# Patient Record
Sex: FEMALE | Race: WHITE | URBAN - METROPOLITAN AREA
[De-identification: names, ages, dates, MRNs, and addresses within clinical notes are randomized per-mention and may not be internally consistent; named-entity substitution may affect disease eponyms.]

---

## 2017-03-28 NOTE — PHARMACY-ADMISSION MEDICATION HISTORY
Med rec completed by pre admitting Hanna Stanford RN Fri Mar 24, 2017  4:34 PM         Prior to Admission medications    Medication Sig Last Dose Taking? Auth Provider   Prenatal Multivit-Min-Fe-FA (PRENATAL VITAMINS PO) Take 1 tablet by mouth daily  Yes Reported, Patient

## 2017-03-30 ENCOUNTER — HOSPITAL ENCOUNTER (OUTPATIENT)
Dept: LAB | Facility: CLINIC | Age: 32
Discharge: HOME | End: 2017-03-30
Attending: OBSTETRICS & GYNECOLOGY | Admitting: OBSTETRICS & GYNECOLOGY
Payer: COMMERCIAL

## 2017-03-30 DIAGNOSIS — Z01.812 PRE-OPERATIVE LABORATORY EXAMINATION: ICD-10-CM

## 2017-03-30 LAB
ABO + RH BLD: NORMAL
ABO + RH BLD: NORMAL
BLD GP AB SCN SERPL QL: NORMAL
BLOOD BANK CMNT PATIENT-IMP: NORMAL
HGB BLD-MCNC: 12.4 G/DL (ref 11.7–15.7)
SPECIMEN EXP DATE BLD: NORMAL
T PALLIDUM IGG+IGM SER QL: NEGATIVE

## 2017-03-30 PROCEDURE — 36415 COLL VENOUS BLD VENIPUNCTURE: CPT | Performed by: OBSTETRICS & GYNECOLOGY

## 2017-03-30 PROCEDURE — 85018 HEMOGLOBIN: CPT | Performed by: OBSTETRICS & GYNECOLOGY

## 2017-03-30 PROCEDURE — 86850 RBC ANTIBODY SCREEN: CPT | Performed by: OBSTETRICS & GYNECOLOGY

## 2017-03-30 PROCEDURE — 86780 TREPONEMA PALLIDUM: CPT | Performed by: OBSTETRICS & GYNECOLOGY

## 2017-03-30 PROCEDURE — 86901 BLOOD TYPING SEROLOGIC RH(D): CPT | Performed by: OBSTETRICS & GYNECOLOGY

## 2017-03-30 PROCEDURE — 86900 BLOOD TYPING SEROLOGIC ABO: CPT | Performed by: OBSTETRICS & GYNECOLOGY

## 2017-03-31 ENCOUNTER — HOSPITAL ENCOUNTER (INPATIENT)
Facility: CLINIC | Age: 32
LOS: 2 days | Discharge: HOME | End: 2017-04-02
Attending: OBSTETRICS & GYNECOLOGY | Admitting: OBSTETRICS & GYNECOLOGY
Payer: COMMERCIAL

## 2017-03-31 ENCOUNTER — ANESTHESIA (OUTPATIENT)
Dept: OBGYN | Facility: CLINIC | Age: 32
End: 2017-03-31
Payer: COMMERCIAL

## 2017-03-31 ENCOUNTER — ANESTHESIA EVENT (OUTPATIENT)
Dept: OBGYN | Facility: CLINIC | Age: 32
End: 2017-03-31
Payer: COMMERCIAL

## 2017-03-31 ENCOUNTER — SURGERY (OUTPATIENT)
Age: 32
End: 2017-03-31

## 2017-03-31 DIAGNOSIS — Z98.891 S/P CESAREAN SECTION: Primary | ICD-10-CM

## 2017-03-31 PROCEDURE — 25800025 ZZH RX 258: Performed by: OBSTETRICS & GYNECOLOGY

## 2017-03-31 PROCEDURE — 25000128 H RX IP 250 OP 636: Performed by: OBSTETRICS & GYNECOLOGY

## 2017-03-31 PROCEDURE — 25000128 H RX IP 250 OP 636: Performed by: NURSE ANESTHETIST, CERTIFIED REGISTERED

## 2017-03-31 PROCEDURE — 71000012 ZZH RECOVERY PHASE 1 LEVEL 1 FIRST HR: Performed by: OBSTETRICS & GYNECOLOGY

## 2017-03-31 PROCEDURE — 12000029 ZZH R&B OB INTERMEDIATE

## 2017-03-31 PROCEDURE — 27210995 ZZH RX 272: Performed by: OBSTETRICS & GYNECOLOGY

## 2017-03-31 PROCEDURE — 36000056 ZZH SURGERY LEVEL 3 1ST 30 MIN: Performed by: OBSTETRICS & GYNECOLOGY

## 2017-03-31 PROCEDURE — 25800025 ZZH RX 258: Performed by: ANESTHESIOLOGY

## 2017-03-31 PROCEDURE — 25000125 ZZHC RX 250: Performed by: NURSE ANESTHETIST, CERTIFIED REGISTERED

## 2017-03-31 PROCEDURE — 25000132 ZZH RX MED GY IP 250 OP 250 PS 637: Performed by: OBSTETRICS & GYNECOLOGY

## 2017-03-31 PROCEDURE — 37000009 ZZH ANESTHESIA TECHNICAL FEE, EACH ADDTL 15 MIN: Performed by: OBSTETRICS & GYNECOLOGY

## 2017-03-31 PROCEDURE — 37000008 ZZH ANESTHESIA TECHNICAL FEE, 1ST 30 MIN: Performed by: OBSTETRICS & GYNECOLOGY

## 2017-03-31 PROCEDURE — 25000125 ZZHC RX 250: Performed by: OBSTETRICS & GYNECOLOGY

## 2017-03-31 PROCEDURE — 27210794 ZZH OR GENERAL SUPPLY STERILE: Performed by: OBSTETRICS & GYNECOLOGY

## 2017-03-31 PROCEDURE — 36000058 ZZH SURGERY LEVEL 3 EA 15 ADDTL MIN: Performed by: OBSTETRICS & GYNECOLOGY

## 2017-03-31 RX ORDER — LABETALOL HYDROCHLORIDE 5 MG/ML
10 INJECTION, SOLUTION INTRAVENOUS
Status: DISCONTINUED | OUTPATIENT
Start: 2017-03-31 | End: 2017-03-31 | Stop reason: HOSPADM

## 2017-03-31 RX ORDER — ONDANSETRON 2 MG/ML
INJECTION INTRAMUSCULAR; INTRAVENOUS PRN
Status: DISCONTINUED | OUTPATIENT
Start: 2017-03-31 | End: 2017-03-31

## 2017-03-31 RX ORDER — DIPHENHYDRAMINE HYDROCHLORIDE 50 MG/ML
25 INJECTION INTRAMUSCULAR; INTRAVENOUS EVERY 6 HOURS PRN
Status: DISCONTINUED | OUTPATIENT
Start: 2017-03-31 | End: 2017-04-02 | Stop reason: HOSPADM

## 2017-03-31 RX ORDER — LANOLIN 100 %
OINTMENT (GRAM) TOPICAL
Status: DISCONTINUED | OUTPATIENT
Start: 2017-03-31 | End: 2017-04-02 | Stop reason: HOSPADM

## 2017-03-31 RX ORDER — ONDANSETRON 2 MG/ML
4 INJECTION INTRAMUSCULAR; INTRAVENOUS EVERY 6 HOURS PRN
Status: DISCONTINUED | OUTPATIENT
Start: 2017-03-31 | End: 2017-03-31 | Stop reason: HOSPADM

## 2017-03-31 RX ORDER — OXYTOCIN 10 [USP'U]/ML
10 INJECTION, SOLUTION INTRAMUSCULAR; INTRAVENOUS
Status: DISCONTINUED | OUTPATIENT
Start: 2017-03-31 | End: 2017-04-02 | Stop reason: HOSPADM

## 2017-03-31 RX ORDER — SODIUM CHLORIDE, SODIUM LACTATE, POTASSIUM CHLORIDE, CALCIUM CHLORIDE 600; 310; 30; 20 MG/100ML; MG/100ML; MG/100ML; MG/100ML
INJECTION, SOLUTION INTRAVENOUS CONTINUOUS
Status: DISCONTINUED | OUTPATIENT
Start: 2017-03-31 | End: 2017-03-31

## 2017-03-31 RX ORDER — AMOXICILLIN 250 MG
1-2 CAPSULE ORAL 2 TIMES DAILY
Status: DISCONTINUED | OUTPATIENT
Start: 2017-03-31 | End: 2017-04-02 | Stop reason: HOSPADM

## 2017-03-31 RX ORDER — OXYTOCIN/0.9 % SODIUM CHLORIDE 30/500 ML
PLASTIC BAG, INJECTION (ML) INTRAVENOUS PRN
Status: DISCONTINUED | OUTPATIENT
Start: 2017-03-31 | End: 2017-03-31

## 2017-03-31 RX ORDER — NALBUPHINE HYDROCHLORIDE 10 MG/ML
2.5-5 INJECTION, SOLUTION INTRAMUSCULAR; INTRAVENOUS; SUBCUTANEOUS EVERY 6 HOURS PRN
Status: DISCONTINUED | OUTPATIENT
Start: 2017-03-31 | End: 2017-03-31 | Stop reason: HOSPADM

## 2017-03-31 RX ORDER — CEFAZOLIN SODIUM 1 G/3ML
1 INJECTION, POWDER, FOR SOLUTION INTRAMUSCULAR; INTRAVENOUS
Status: DISCONTINUED | OUTPATIENT
Start: 2017-03-31 | End: 2017-03-31

## 2017-03-31 RX ORDER — METOCLOPRAMIDE HYDROCHLORIDE 5 MG/ML
10 INJECTION INTRAMUSCULAR; INTRAVENOUS EVERY 6 HOURS PRN
Status: DISCONTINUED | OUTPATIENT
Start: 2017-03-31 | End: 2017-04-02 | Stop reason: HOSPADM

## 2017-03-31 RX ORDER — HYDROMORPHONE HYDROCHLORIDE 1 MG/ML
.3-.5 INJECTION, SOLUTION INTRAMUSCULAR; INTRAVENOUS; SUBCUTANEOUS EVERY 30 MIN PRN
Status: DISCONTINUED | OUTPATIENT
Start: 2017-03-31 | End: 2017-04-02 | Stop reason: HOSPADM

## 2017-03-31 RX ORDER — HYDRALAZINE HYDROCHLORIDE 20 MG/ML
2.5-5 INJECTION INTRAMUSCULAR; INTRAVENOUS EVERY 10 MIN PRN
Status: DISCONTINUED | OUTPATIENT
Start: 2017-03-31 | End: 2017-03-31 | Stop reason: HOSPADM

## 2017-03-31 RX ORDER — LIDOCAINE 40 MG/G
CREAM TOPICAL
Status: DISCONTINUED | OUTPATIENT
Start: 2017-03-31 | End: 2017-04-02 | Stop reason: HOSPADM

## 2017-03-31 RX ORDER — NALOXONE HYDROCHLORIDE 0.4 MG/ML
.1-.4 INJECTION, SOLUTION INTRAMUSCULAR; INTRAVENOUS; SUBCUTANEOUS
Status: DISCONTINUED | OUTPATIENT
Start: 2017-03-31 | End: 2017-03-31 | Stop reason: HOSPADM

## 2017-03-31 RX ORDER — ONDANSETRON 2 MG/ML
4 INJECTION INTRAMUSCULAR; INTRAVENOUS EVERY 6 HOURS PRN
Status: DISCONTINUED | OUTPATIENT
Start: 2017-03-31 | End: 2017-04-02 | Stop reason: HOSPADM

## 2017-03-31 RX ORDER — NALBUPHINE HYDROCHLORIDE 10 MG/ML
INJECTION, SOLUTION INTRAMUSCULAR; INTRAVENOUS; SUBCUTANEOUS
Status: DISCONTINUED
Start: 2017-03-31 | End: 2017-03-31 | Stop reason: WASHOUT

## 2017-03-31 RX ORDER — LIDOCAINE 40 MG/G
CREAM TOPICAL
Status: DISCONTINUED | OUTPATIENT
Start: 2017-03-31 | End: 2017-03-31 | Stop reason: HOSPADM

## 2017-03-31 RX ORDER — EPHEDRINE SULFATE 50 MG/ML
INJECTION, SOLUTION INTRAVENOUS PRN
Status: DISCONTINUED | OUTPATIENT
Start: 2017-03-31 | End: 2017-03-31

## 2017-03-31 RX ORDER — ACETAMINOPHEN 325 MG/1
975 TABLET ORAL ONCE
Status: COMPLETED | OUTPATIENT
Start: 2017-03-31 | End: 2017-03-31

## 2017-03-31 RX ORDER — NALOXONE HYDROCHLORIDE 0.4 MG/ML
.1-.4 INJECTION, SOLUTION INTRAMUSCULAR; INTRAVENOUS; SUBCUTANEOUS
Status: DISCONTINUED | OUTPATIENT
Start: 2017-03-31 | End: 2017-04-02 | Stop reason: HOSPADM

## 2017-03-31 RX ORDER — KETOROLAC TROMETHAMINE 30 MG/ML
15 INJECTION, SOLUTION INTRAMUSCULAR; INTRAVENOUS EVERY 6 HOURS
Status: COMPLETED | OUTPATIENT
Start: 2017-03-31 | End: 2017-04-01

## 2017-03-31 RX ORDER — ACETAMINOPHEN 325 MG/1
975 TABLET ORAL EVERY 8 HOURS
Status: DISCONTINUED | OUTPATIENT
Start: 2017-03-31 | End: 2017-04-02 | Stop reason: HOSPADM

## 2017-03-31 RX ORDER — BISACODYL 10 MG
10 SUPPOSITORY, RECTAL RECTAL DAILY PRN
Status: DISCONTINUED | OUTPATIENT
Start: 2017-04-02 | End: 2017-04-02 | Stop reason: HOSPADM

## 2017-03-31 RX ORDER — IBUPROFEN 400 MG/1
400-800 TABLET, FILM COATED ORAL EVERY 6 HOURS PRN
Status: DISCONTINUED | OUTPATIENT
Start: 2017-04-01 | End: 2017-04-02 | Stop reason: HOSPADM

## 2017-03-31 RX ORDER — ONDANSETRON 2 MG/ML
4 INJECTION INTRAMUSCULAR; INTRAVENOUS EVERY 30 MIN PRN
Status: DISCONTINUED | OUTPATIENT
Start: 2017-03-31 | End: 2017-03-31 | Stop reason: HOSPADM

## 2017-03-31 RX ORDER — OXYTOCIN/0.9 % SODIUM CHLORIDE 30/500 ML
340 PLASTIC BAG, INJECTION (ML) INTRAVENOUS CONTINUOUS PRN
Status: DISCONTINUED | OUTPATIENT
Start: 2017-03-31 | End: 2017-04-02 | Stop reason: HOSPADM

## 2017-03-31 RX ORDER — HYDROCORTISONE 2.5 %
CREAM (GRAM) TOPICAL 3 TIMES DAILY PRN
Status: DISCONTINUED | OUTPATIENT
Start: 2017-03-31 | End: 2017-04-02 | Stop reason: HOSPADM

## 2017-03-31 RX ORDER — DEXTROSE, SODIUM CHLORIDE, SODIUM LACTATE, POTASSIUM CHLORIDE, AND CALCIUM CHLORIDE 5; .6; .31; .03; .02 G/100ML; G/100ML; G/100ML; G/100ML; G/100ML
INJECTION, SOLUTION INTRAVENOUS CONTINUOUS
Status: DISCONTINUED | OUTPATIENT
Start: 2017-03-31 | End: 2017-04-02 | Stop reason: HOSPADM

## 2017-03-31 RX ORDER — EPHEDRINE SULFATE 50 MG/ML
5 INJECTION, SOLUTION INTRAMUSCULAR; INTRAVENOUS; SUBCUTANEOUS
Status: DISCONTINUED | OUTPATIENT
Start: 2017-03-31 | End: 2017-03-31 | Stop reason: HOSPADM

## 2017-03-31 RX ORDER — SODIUM CHLORIDE, SODIUM LACTATE, POTASSIUM CHLORIDE, CALCIUM CHLORIDE 600; 310; 30; 20 MG/100ML; MG/100ML; MG/100ML; MG/100ML
INJECTION, SOLUTION INTRAVENOUS CONTINUOUS
Status: DISCONTINUED | OUTPATIENT
Start: 2017-03-31 | End: 2017-03-31 | Stop reason: HOSPADM

## 2017-03-31 RX ORDER — ONDANSETRON 4 MG/1
4 TABLET, ORALLY DISINTEGRATING ORAL EVERY 6 HOURS PRN
Status: DISCONTINUED | OUTPATIENT
Start: 2017-03-31 | End: 2017-03-31 | Stop reason: HOSPADM

## 2017-03-31 RX ORDER — MAGNESIUM HYDROXIDE 1200 MG/15ML
LIQUID ORAL PRN
Status: DISCONTINUED | OUTPATIENT
Start: 2017-03-31 | End: 2017-04-02 | Stop reason: HOSPADM

## 2017-03-31 RX ORDER — MORPHINE SULFATE 1 MG/ML
0.2 INJECTION, SOLUTION EPIDURAL; INTRATHECAL; INTRAVENOUS ONCE
Status: DISCONTINUED | OUTPATIENT
Start: 2017-03-31 | End: 2017-03-31 | Stop reason: HOSPADM

## 2017-03-31 RX ORDER — DIPHENHYDRAMINE HCL 25 MG
25 CAPSULE ORAL EVERY 6 HOURS PRN
Status: DISCONTINUED | OUTPATIENT
Start: 2017-03-31 | End: 2017-04-02 | Stop reason: HOSPADM

## 2017-03-31 RX ORDER — ACETAMINOPHEN 325 MG/1
650 TABLET ORAL EVERY 4 HOURS PRN
Status: DISCONTINUED | OUTPATIENT
Start: 2017-04-03 | End: 2017-04-02 | Stop reason: HOSPADM

## 2017-03-31 RX ORDER — PROCHLORPERAZINE 25 MG
25 SUPPOSITORY, RECTAL RECTAL EVERY 12 HOURS PRN
Status: DISCONTINUED | OUTPATIENT
Start: 2017-03-31 | End: 2017-04-02 | Stop reason: HOSPADM

## 2017-03-31 RX ORDER — OXYCODONE HYDROCHLORIDE 5 MG/1
5-10 TABLET ORAL
Status: DISCONTINUED | OUTPATIENT
Start: 2017-03-31 | End: 2017-04-02 | Stop reason: HOSPADM

## 2017-03-31 RX ORDER — SIMETHICONE 80 MG
80 TABLET,CHEWABLE ORAL 4 TIMES DAILY PRN
Status: DISCONTINUED | OUTPATIENT
Start: 2017-03-31 | End: 2017-04-02 | Stop reason: HOSPADM

## 2017-03-31 RX ORDER — ONDANSETRON 4 MG/1
4 TABLET, ORALLY DISINTEGRATING ORAL EVERY 30 MIN PRN
Status: DISCONTINUED | OUTPATIENT
Start: 2017-03-31 | End: 2017-03-31 | Stop reason: HOSPADM

## 2017-03-31 RX ORDER — CEFAZOLIN SODIUM 2 G/100ML
2 INJECTION, SOLUTION INTRAVENOUS
Status: COMPLETED | OUTPATIENT
Start: 2017-03-31 | End: 2017-03-31

## 2017-03-31 RX ORDER — OXYTOCIN/0.9 % SODIUM CHLORIDE 30/500 ML
100 PLASTIC BAG, INJECTION (ML) INTRAVENOUS CONTINUOUS
Status: DISCONTINUED | OUTPATIENT
Start: 2017-03-31 | End: 2017-04-02 | Stop reason: HOSPADM

## 2017-03-31 RX ADMIN — EPHEDRINE SULFATE 5 MG: 50 INJECTION INTRAMUSCULAR; INTRAVENOUS; SUBCUTANEOUS at 10:24

## 2017-03-31 RX ADMIN — HYDROMORPHONE HYDROCHLORIDE 0.5 MG: 1 INJECTION, SOLUTION INTRAMUSCULAR; INTRAVENOUS; SUBCUTANEOUS at 14:40

## 2017-03-31 RX ADMIN — PHENYLEPHRINE HYDROCHLORIDE 100 MCG: 10 INJECTION, SOLUTION INTRAMUSCULAR; INTRAVENOUS; SUBCUTANEOUS at 10:32

## 2017-03-31 RX ADMIN — METOCLOPRAMIDE 10 MG: 5 INJECTION, SOLUTION INTRAMUSCULAR; INTRAVENOUS at 17:18

## 2017-03-31 RX ADMIN — SODIUM CHLORIDE 1300 ML: 900 IRRIGANT IRRIGATION at 11:35

## 2017-03-31 RX ADMIN — EPHEDRINE SULFATE 5 MG: 50 INJECTION INTRAMUSCULAR; INTRAVENOUS; SUBCUTANEOUS at 11:30

## 2017-03-31 RX ADMIN — EPHEDRINE SULFATE 5 MG: 50 INJECTION INTRAMUSCULAR; INTRAVENOUS; SUBCUTANEOUS at 10:59

## 2017-03-31 RX ADMIN — SODIUM CHLORIDE, POTASSIUM CHLORIDE, SODIUM LACTATE AND CALCIUM CHLORIDE: 600; 310; 30; 20 INJECTION, SOLUTION INTRAVENOUS at 10:14

## 2017-03-31 RX ADMIN — CEFAZOLIN SODIUM 2 G: 2 INJECTION, SOLUTION INTRAVENOUS at 10:15

## 2017-03-31 RX ADMIN — SODIUM CHLORIDE, POTASSIUM CHLORIDE, SODIUM LACTATE AND CALCIUM CHLORIDE 1000 ML: 600; 310; 30; 20 INJECTION, SOLUTION INTRAVENOUS at 09:07

## 2017-03-31 RX ADMIN — ONDANSETRON 4 MG: 2 INJECTION INTRAMUSCULAR; INTRAVENOUS at 10:24

## 2017-03-31 RX ADMIN — SODIUM CHLORIDE, POTASSIUM CHLORIDE, SODIUM LACTATE AND CALCIUM CHLORIDE: 600; 310; 30; 20 INJECTION, SOLUTION INTRAVENOUS at 10:04

## 2017-03-31 RX ADMIN — SODIUM CITRATE AND CITRIC ACID MONOHYDRATE 30 ML: 500; 334 SOLUTION ORAL at 09:48

## 2017-03-31 RX ADMIN — SODIUM CHLORIDE, POTASSIUM CHLORIDE, SODIUM LACTATE AND CALCIUM CHLORIDE 1000 ML: 600; 310; 30; 20 INJECTION, SOLUTION INTRAVENOUS at 10:07

## 2017-03-31 RX ADMIN — SENNOSIDES AND DOCUSATE SODIUM 1 TABLET: 8.6; 5 TABLET ORAL at 20:32

## 2017-03-31 RX ADMIN — ACETAMINOPHEN 975 MG: 325 TABLET, FILM COATED ORAL at 09:33

## 2017-03-31 RX ADMIN — ACETAMINOPHEN 975 MG: 325 TABLET, FILM COATED ORAL at 20:32

## 2017-03-31 RX ADMIN — OXYTOCIN-SODIUM CHLORIDE 0.9% IV SOLN 30 UNIT/500ML 500 ML: 30-0.9/5 SOLUTION at 10:44

## 2017-03-31 RX ADMIN — OXYTOCIN-SODIUM CHLORIDE 0.9% IV SOLN 30 UNIT/500ML 500 ML: 30-0.9/5 SOLUTION at 11:28

## 2017-03-31 RX ADMIN — OXYTOCIN-SODIUM CHLORIDE 0.9% IV SOLN 30 UNIT/500ML 100 ML/HR: 30-0.9/5 SOLUTION at 12:00

## 2017-03-31 RX ADMIN — SODIUM CHLORIDE, SODIUM LACTATE, POTASSIUM CHLORIDE, CALCIUM CHLORIDE AND DEXTROSE MONOHYDRATE: 5; 600; 310; 30; 20 INJECTION, SOLUTION INTRAVENOUS at 17:05

## 2017-03-31 RX ADMIN — OXYCODONE HYDROCHLORIDE 5 MG: 5 TABLET ORAL at 13:36

## 2017-03-31 RX ADMIN — EPHEDRINE SULFATE 5 MG: 50 INJECTION INTRAMUSCULAR; INTRAVENOUS; SUBCUTANEOUS at 10:38

## 2017-03-31 RX ADMIN — KETOROLAC TROMETHAMINE 15 MG: 30 INJECTION, SOLUTION INTRAMUSCULAR at 19:25

## 2017-03-31 RX ADMIN — EPHEDRINE SULFATE 5 MG: 50 INJECTION INTRAMUSCULAR; INTRAVENOUS; SUBCUTANEOUS at 11:29

## 2017-03-31 NOTE — PLAN OF CARE
Problem: Goal Outcome Summary  Goal: Goal Outcome Summary  Outcome: Improving  Report and care of patient received at 1350, spouse and infant present, oriented to unit. No nausea, C/O of abdominal pain, dilaudid IV given with some relief. ICE and abdominal binder helpful as well. Moves legs well. Morley draining and IVF as ordered. Abdominal pressure dressing CDI. Anticipate D/C PPD 3.

## 2017-03-31 NOTE — ANESTHESIA PROCEDURE NOTES
Peripheral nerve/Neuraxial procedure note : intrathecal  Pre-Procedure  Performed by MICHAEL BAIRD  Referred by MANN  Location: OB, OR      Pre-Anesthestic Checklist: patient identified, IV checked, risks and benefits discussed, informed consent, monitors and equipment checked, pre-op evaluation and at physician/surgeon's request    Timeout  Correct Patient: Yes   Correct Procedure: Yes   Correct Site: Yes   Correct Laterality: N/A   Correct Position: Yes   Site Marked: N/A   .   Procedure Documentation  ASA 1  Diagnosis:repeat cs.    Procedure:    Intrathecal.  Insertion Site:L3-4  (midline approach)      Patient Prep;mask, sterile gloves, povidone-iodine 7.5% surgical scrub, patient draped.  .  Needle: (). # of attempts: 1. # of redirects:. Spinal Needle: Sally tip 25 G. 3.5 in.  Introducer used. . .     Assessment/Narrative  Paresthesias: No.  .  .  0.01 mL of clear CSF fluid removed . Comments:  0.2 astromorph and 2cc .75marc

## 2017-03-31 NOTE — PLAN OF CARE
Patient arrived to L and D unit at 0835 for scheduled  section.  Denies leakage of fluid, vaginal bleeding, headache, epigastric pain, visual disturbances.  Fetal movement present.  External monitors applied.  Physical assessment and health history taken.  Admission questions answered and bill of rights reviewed.  PLAN:  Orders for pre-op from Dr. Carter.

## 2017-03-31 NOTE — PLAN OF CARE
Data: Rhiannon Stauffer transferred to 422 via cart at 1350. Baby transferred via parent's arms.  Action: Receiving unit notified of transfer: Yes. Patient and family notified of room change. Report given to Rosalba IRIZARRY RN at 1350. Belongings sent to receiving unit. Accompanied by Registered Nurse. Oriented patient to surroundings. Call light within reach. ID bands double-checked with receiving RN.  Response: Patient tolerated transfer and is stable.

## 2017-03-31 NOTE — ANESTHESIA POSTPROCEDURE EVALUATION
Patient: Rhiannon Stauffer    Procedure(s):  Repeat  Section  - Wound Class: I-Clean    Diagnosis:Previous    Diagnosis Additional Information: Previous cs  Dr Carter    Anesthesia Type:  Spinal    Note:  Anesthesia Post Evaluation    Patient location during evaluation: PACU  Patient participation: Able to fully participate in evaluation  Level of consciousness: awake  Pain management: adequate  Airway patency: patent  Cardiovascular status: acceptable  Respiratory status: acceptable  Hydration status: acceptable  PONV: none     Anesthetic complications: None          Last vitals:  Vitals:    17 0855 17 0925 17 1159   BP: 125/82  103/66   Resp:  18    Temp: 98.2  F (36.8  C)  98.4  F (36.9  C)         Electronically Signed By: Myke Velasquez MD  2017  11:59 AM

## 2017-03-31 NOTE — IP AVS SNAPSHOT
[facility]    [address]    [facility] [address]    Phone:  [phone]    Fax:  [phone]                                       After Visit Summary   3/31/2017    Rhiannon Stauffer    MRN: [id number]           After Visit Summary Signature Page     I have received my discharge instructions, and my questions have been answered. I have discussed any challenges I see with this plan with the nurse or doctor.    ..........................................................................................................................................  Patient/Patient Representative Signature      ..........................................................................................................................................  Patient Representative Print Name and Relationship to Patient    ..................................................               ................................................  Date                                            Time    ..........................................................................................................................................  Reviewed by Signature/Title    ...................................................              ..............................................  Date                                                            Time

## 2017-03-31 NOTE — LETTER
"[facility] Postpartum Home Care Referral  [facility]PART[facility]  [address]  [facility] [address]  Phone: [phone]  Fax: [phone]    Date of Referral: 2017    Rhiannon Davila MRN# [id number]   Age: 31 year old YOB: 1985           Date of Admission:  3/31/2017  8:42 AM    Primary care provider: Sindy Crater  Attending Provider: No att. providers found    Payor: BCBS / Plan: [employer] EMPLOYEE PROGRAM / Product Type: PPO /          Pregnancy History:   The details of the mother's pregnancy are as follows:  OBSTETRIC HISTORY:  @[age@  EDC: Estimated Date of Delivery: 17  Obstetric History       T2      TAB0   SAB3   E0   M0   L1       # Outcome Date GA Lbr Michel/2nd Weight Sex Delivery Anes PTL Lv   5 Term 17 39w1d  4.01 kg (8 lb 13.5 oz) F CS-LTranv Spinal N Y      Name: PREMA DAVILA      Apgar1:  8                Apgar5: 9   4 SAB            3 SAB            2 SAB            1 Term                   Prenatal Labs:   Lab Results   Component Value Date    ABO O 2017    RH  Pos 2017    AS Neg 2017    TREPAB Negative 2017    HGB 10.3 (L) 2017       GBS Status:  No results found for: GBS           Maternal History:   (NOTE - see maternal data and prenatal history report to review, select from baby index report)                      Family History:   This patient has no significant family history          Social History:   This  has no significant social history       Birth  History:      Birth Information  Information for the patient's :  Prema Davila [[id number]]     Birth History     Birth     Length: 0.521 m (1' 8.5\")     Weight: 4.01 kg (8 lb 13.5 oz)     HC 36.2 cm     Apgar     One: 8     Five: 9     Delivery Method: , Low Transverse     Gestation Age: 39 1/7 wks       Information for the patient's :  Prema Davila [[id number]] "     Immunization History   Administered Date(s) Administered     Hepatitis B 2017             Information     Feeding plan:   Information for the patient's :  Prema Stauffer [[id number]]           Latch:   Information for the patient's :  Prema Stauffer [[id number]]   10      Information for the patient's :  Prema Stauffer [[id number]]   Vitals  Pulse: 136  Heart Sounds: no murmur detected  Cardiac Regularity: Regular  Resp: 58  Temp: 98.3  F (36.8  C)  Temp src: Axillary      Information for the patient's :  Prema Stauffer [[id number]]         Information for the patient's :  Prema Stauffer [[id number]]   Weight: 3.742 kg (8 lb 4 oz)     Information for the patient's :  Prema Stauffer [[id number]]   Percent Weight Change Since Birth: -6.7       Information for the patient's :  Prema Stauffer [[id number]]   Hearing Screen Date: 17    Information for the patient's :  Prema Stauffer [[id number]]   Hearing Response: Left pass, Right pass      Bilirubin Results:     Information for the patient's :  Prema Stauffer [[id number]]     Recent Labs   Lab Test  17   1037   TCBIL  4.8            Discharge Meds:      Rhiannon Stauffer   Home Medication Instructions AGUSTINA:[id number]    Printed on:17 1401   Medication Information                      ibuprofen (ADVIL/MOTRIN) 400 MG tablet  Take 1-2 tablets (400-800 mg) by mouth every 6 hours as needed for other (cramping)             oxyCODONE-acetaminophen (PERCOCET) 5-325 MG per tablet  Take 1-2 tablets by mouth every 6 hours as needed for pain maximum 8 tablet(s) per day             Prenatal Multivit-Min-Fe-FA (PRENATAL VITAMINS PO)  Take 1 tablet by mouth daily             senna-docusate (SENOKOT-S;PERICOLACE) 8.6-50 MG per tablet  Take 1-2 tablets by mouth 2 times daily                 Information for the patient's :  Prema Stauffer  [[id number]]     There are no discharge medications for this patient.           Summary of Plan of Care:     Home Care to draw  Screen? {YES LAB SLIP SENT HOME; NO:[id number]}    Home Care Agency referred to: [facility]      Early discharge to home C/S    Bibi Jasso RN

## 2017-03-31 NOTE — OP NOTE
[facility]   Operative Note     Date of Procedure: 17    Preoperative diagnosis: 39w1d gestation, history of prior  section with desire for repeat, history of recurrent miscarriages    Post operative diagnosis: same    Procedure: repeat low transverse  section with double layer closure of uterine incision    Surgeon: Sindy Carter MD    Anesthesia: Spinal, Myke Velasquez MD    Indication: Rhiannon is a 31 year old  who presented at 39w1d gestation with history of prior  section and desire for repeat  delivery.     EBL: 900 mL    Specimens: fetal cord blood    Findings: viable, cephalic, female infant. 8 lb, 13.4 oz (4010 gm). Agars of 8 at 1 minute and 9 at 5 minutes. Normal uterus tubes and ovaries. Dense scar bands from uterus to anterior abdominal wall, bladder adhered slightly cephalad on the left side.     Procedure: Patient was taken to the operating room. Anesthesia was dosed and found to be adequate. Patient was prepared and draped in the normal sterile fashion in the supine position with a leftward tilt. Surgical time out was performed. Anesthesia relief was confirmed. Skin incision was made with the scalpel both superior and inferior to prior scar to ellipse this thickened scar. Scar tissue was then elevated upward with Allis clamps and the tissue was excised with the Bovie. Incision was carried through to the underlying layer of fascia with the Bovie. Fascia was incised in the midline and this incision was extended laterally with Brooke scissors. Rectus muscles were seperated in the midline with noted scarring between them.  Peritoneum was entered bluntly with the digit.  Periteneum and scar tissue was taken down with the Bovie. This peritoneal incision was extended with lateral traction as well as scar tissue take down with the Bovie as well. Bladder blade was inserted and vesicouterine peritoneum was inspected. Lower uterine segment was  incised in a transverse fashion with the scalpel. This incision was continued to the uterine cavity and the uterine cavity was entered with the digit. Uterine incision was extended with caudal-cephalad traction. Viable infant was delivered atraumatically from a cephalic position.  Vacuum was applied to assist in delivery but pop off x1 occurred and was felt to be due to poor suction due to infant hair. Head was delivered.  Nose and mouth were suctioned with bulb suctioning. Cord was clamped and cut. Infant was handed off the the waiting nursing staff. Placenta was delivered spontaneously and discarded. Uterus was exteriorized and wiped clean of all clot and debris with a moist sponge. Thick scar band from anterior left uterus to anterior abdominal was was taken down with the Bovie.  Running locking 0-Vicryl was then used in this area to overlay the uterine serosal and reapproximate due to bleeding.  The distal end of the scar band was also bleeding and required additional sutures of 3-0 Vicryl for hemostasis.  Uterine incision was closed with 0-Vicryl in a running fashion. An imbricating second layer was created with 0-Vicryl. Inspection of the uterine incision demonstrated continued oozing at the right apex.  Figure of eight suturing was attempted but continued oozing.  O'Boston stitch was performed. The uterus was returned to the abdomen. Gutters were wiped clean of all clot and debris. Pankaj was applied to scar band areas as well as the right uterus apex.  The bladder blade was reinserted and prolonged inpsection of the uterine incision again demonstrated hemostasis. The rectus muscles were inspected and noted to be hemostatic with use of electrocautery. The fascia was re-approximated with 0-Vicryl in a running fashion. Subcutaneous space was inspected and hemostasis was achieved with electrocautery. Subcutaneous space was re approximated with 2-0 Vicryl. Skin was closed with 4-0 Monocryl in a subcuticular  fashion. Steri strips were applied.  Pressure bandage applied as well as abdominal binder.  Sponge, lap and needle counts were correct times two as counted and reported by the nursing staff.

## 2017-03-31 NOTE — ANESTHESIA PREPROCEDURE EVALUATION
PAC NOTE:       ANESTHESIA PRE EVALUATION:  Anesthesia Evaluation     . Pt has had prior anesthetic.     No history of anesthetic complications          ROS/MED HX    ENT/Pulmonary:       Neurologic:       Cardiovascular:         METS/Exercise Tolerance:     Hematologic:         Musculoskeletal:         GI/Hepatic:         Renal/Genitourinary:         Endo:         Psychiatric:         Infectious Disease:         Malignancy:         Other:    (+) Possibly pregnant                    Physical Exam  Normal systems: cardiovascular, pulmonary and dental    Airway   Mallampati: II  TM distance: >3 FB  Neck ROM: full    Dental     Cardiovascular       Pulmonary              Anesthesia Plan      History & Physical Review  History and physical reviewed and following examination; no interval change.    ASA Status:  1 .    NPO Status:  > 8 hours    Plan for Spinal     SAB w/ duramorph for POP, GETA as backup PRN      Postoperative Care  Postoperative pain management:  IV analgesics and Neuraxial analgesia.      Consents  Anesthetic plan, risks, benefits and alternatives discussed with:  Patient.  Use of blood products discussed: Yes.   .                            .

## 2017-03-31 NOTE — IP AVS SNAPSHOT
MRN:[id number]                      After Visit Summary   3/31/2017    Rhiannon Stauffer    MRN: [id number]           Thank you!     Thank you for choosing [facility] for your care. Our goal is always to provide you with excellent care. Hearing back from our patients is one way we can continue to improve our services. Please take a few minutes to complete the written survey that you may receive in the mail after you visit. If you would like to speak to someone directly about your visit please contact Patient Relations at [phone]. Thank you!          Patient Information     Date Of Birth          1985        About your hospital stay     You were admitted on:  2017 You last received care in the:  [facility]    You were discharged on:  2017       Who to Call     For medical emergencies, please call 911.  For non-urgent questions about your medical care, please call your primary care provider or clinic, [phone]  For questions related to your surgery, please call your surgery clinic        Attending Provider     Provider Specialty    Sindy Carter MD OB/Gyn       Primary Care Provider Office Phone # Fax #    Sindy Carter [phone]       [facility] [address] [place] [address]        After Care Instructions     Activity       Review discharge instructions            Diet       Resume previous diet            Discharge Instructions - Postpartum visit       Schedule postpartum visit with your provider and return to clinic in 6 weeks.                  Further instructions from your care team       Postop  Birth Instructions  Follow up in clinic in 6 weeks for postpartum check.  Lactation: [phone]    Activity       Do not lift more than 10 pounds for 6 weeks after surgery.  Ask family and friends for help when you need it.    No driving until you have stopped taking  your pain medications (usually two weeks after surgery).    No heavy exercise or activity for 6 weeks.  Don't do anything that will put a strain on your surgery site.    Don't strain when using the toilet.  Your care team may prescribe a stool softener if you have problems with your bowel movements.     To care for your incision:       Keep the incision clean and dry.    Do not soak your incision in water. No swimming or hot tubs until it has fully healed. You may soak in the bathtub if the water level is below your incision.    Do not use peroxide, gel, cream, lotion, or ointment on your incision.    Adjust your clothes to avoid pressure on your surgery site (check the elastic in your underwear for example).     You may see a small amount of clear or pink drainage and this is normal.  Check with your health care provider:       If the drainage increases or has an odor.    If the incision reddens, you have swelling, or develop a rash.    If you have increased pain and the medicine we prescribed doesn't help.    If you have a fever above 100.4 F (38 C) with or without chills when placing thermometer under your tongue.   The area around your incision (surgery wound), will feel numb.  This is normal. The numbness should go away in less than a year.     Keep your hands clean:  Always wash your hands before touching your incision (surgery wound). This helps reduce your risk of infection. If your hands aren't dirty, you may use an alcohol hand-rub to clean your hands. Keep your nails clean and short.    Call your healthcare provider if you have any of these symptoms:       You soak a sanitary pad with blood within 1 hour, or you see blood clots larger than a golf ball.    Bleeding that lasts more than 6 weeks.    Vaginal discharge that smells bad.    Severe pain, cramping or tenderness in your lower belly area.    A need to urinate more frequently (use the toilet more often), more urgently (use the toilet very quickly),  "or it burns when you urinate.    Nausea and vomiting.    Redness, swelling or pain around a vein in your leg.    Problems breastfeeding or a red or painful area on your breast.    Chest pain and cough or are gasping for air.    Problems with coping with sadness, anxiety or depression. If you have concerns about hurting yourself or the baby, call your provider immediately.      You have questions or concerns after you return home.                  Pending Results     No orders found from 3/29/2017 to 2017.            Statement of Approval     Ordered          17 1126  I have reviewed and agree with all the recommendations and orders detailed in this document.  EFFECTIVE NOW     Approved and electronically signed by:  Mal Sanchez MD             Admission Information     Date & Time Provider Department Dept. Phone    3/31/2017 Sindy Carter MD [facility] [phone]      Your Vitals Were     Blood Pressure Pulse Temperature Respirations Height Weight    113/72 63 97.7  F (36.5  C) (Oral) 18 1.651 m (5' 5\") 81.2 kg (179 lb)    Last Period Pulse Oximetry BMI (Body Mass Index)             2016 98% 29.79 kg/m2         MyChart Information     [email or web address] lets you send messages to your doctor, view your test results, renew your prescriptions, schedule appointments and more. To sign up, go to [email or web address] . Click on \"Log in\" on the left side of the screen, which will take you to the Welcome page. Then click on \"Sign up Now\" on the right side of the page.     You will be asked to enter the access code listed below, as well as some personal information. Please follow the directions to create your username and password.     Your access code is: [email or web address]  Expires: 2017 11:28 AM     Your access code will  in 90 days. If you need help or a new code, please call your [place] clinic or [phone].        Care EveryWhere ID     This is your Care " EveryWhere ID. This could be used by other organizations to access your [place] medical records  [id number]           Review of your medicines      START taking        Dose / Directions    ibuprofen 400 MG tablet   Commonly known as:  ADVIL/MOTRIN   Used for:  S/P  section        Dose:  400-800 mg   Take 1-2 tablets (400-800 mg) by mouth every 6 hours as needed for other (cramping)   Quantity:  120 tablet   Refills:  0       oxyCODONE-acetaminophen 5-325 MG per tablet   Commonly known as:  PERCOCET   Used for:  S/P  section        Dose:  1-2 tablet   Take 1-2 tablets by mouth every 6 hours as needed for pain maximum 8 tablet(s) per day   Quantity:  18 tablet   Refills:  0       senna-docusate 8.6-50 MG per tablet   Commonly known as:  SENOKOT-S;PERICOLACE   Used for:  S/P  section        Dose:  1-2 tablet   Take 1-2 tablets by mouth 2 times daily   Quantity:  100 tablet   Refills:  0         CONTINUE these medicines which have NOT CHANGED        Dose / Directions    PRENATAL VITAMINS PO        Dose:  1 tablet   Take 1 tablet by mouth daily   Refills:  0            Where to get your medicines      These medications were sent to [facility] [address] [place] [address]     Phone:  [phone]     ibuprofen 400 MG tablet    senna-docusate 8.6-50 MG per tablet         Some of these will need a paper prescription and others can be bought over the counter. Ask your nurse if you have questions.     Bring a paper prescription for each of these medications     oxyCODONE-acetaminophen 5-325 MG per tablet                Protect others around you: Learn how to safely use, store and throw away your medicines at www.disposemymeds.org.             Medication List: This is a list of all your medications and when to take them. Check marks below indicate your daily home schedule. Keep this list as a reference.      Medications            Morning Afternoon Evening Bedtime As Needed    ibuprofen 400 MG tablet   Commonly known as:  ADVIL/MOTRIN   Take 1-2 tablets (400-800 mg) by mouth every 6 hours as needed for other (cramping)   Last time this was given:  800 mg on 4/2/2017  6:19 AM                                   oxyCODONE-acetaminophen 5-325 MG per tablet   Commonly known as:  PERCOCET   Take 1-2 tablets by mouth every 6 hours as needed for pain maximum 8 tablet(s) per day                                   PRENATAL VITAMINS PO   Take 1 tablet by mouth daily                                   senna-docusate 8.6-50 MG per tablet   Commonly known as:  SENOKOT-S;PERICOLACE   Take 1-2 tablets by mouth 2 times daily   Last time this was given:  2 tablets on 4/2/2017  9:30 AM

## 2017-03-31 NOTE — ANESTHESIA CARE TRANSFER NOTE
Patient: Rhiannon Stauffer    Procedure(s):  Repeat  Section  - Wound Class: I-Clean    Diagnosis: Previous    Diagnosis Additional Information: Previous cs  Dr Carter    Anesthesia Type:   Spinal     Note:    Patient transferred to:Labor and Delivery  Comments: Pt tolerated spinal well to L&D Recovery VSS Report to RN      Vitals: (Last set prior to Anesthesia Care Transfer)    CRNA VITALS  3/31/2017 1124 - 3/31/2017 1158      3/31/2017             Pulse: 77    SpO2: 97 %                Electronically Signed By: JAMES Stewart CRNA  2017  11:58 AM

## 2017-03-31 NOTE — INTERVAL H&P NOTE
This H&P has been reviewed and there are no clinically significant changes in the patient s condition.  The Patient is approved for surgery.      39 wks 1 day gestation. Plan for repeat  section.

## 2017-04-01 LAB — HGB BLD-MCNC: 10.3 G/DL (ref 11.7–15.7)

## 2017-04-01 PROCEDURE — 25000132 ZZH RX MED GY IP 250 OP 250 PS 637: Performed by: OBSTETRICS & GYNECOLOGY

## 2017-04-01 PROCEDURE — 25000128 H RX IP 250 OP 636: Performed by: OBSTETRICS & GYNECOLOGY

## 2017-04-01 PROCEDURE — 36415 COLL VENOUS BLD VENIPUNCTURE: CPT | Performed by: OBSTETRICS & GYNECOLOGY

## 2017-04-01 PROCEDURE — 85018 HEMOGLOBIN: CPT | Performed by: OBSTETRICS & GYNECOLOGY

## 2017-04-01 PROCEDURE — 12000029 ZZH R&B OB INTERMEDIATE

## 2017-04-01 RX ADMIN — ACETAMINOPHEN 975 MG: 325 TABLET, FILM COATED ORAL at 13:01

## 2017-04-01 RX ADMIN — SENNOSIDES AND DOCUSATE SODIUM 1 TABLET: 8.6; 5 TABLET ORAL at 09:28

## 2017-04-01 RX ADMIN — METOCLOPRAMIDE 10 MG: 5 INJECTION, SOLUTION INTRAMUSCULAR; INTRAVENOUS at 02:53

## 2017-04-01 RX ADMIN — IBUPROFEN 800 MG: 400 TABLET ORAL at 18:48

## 2017-04-01 RX ADMIN — OXYCODONE HYDROCHLORIDE 10 MG: 5 TABLET ORAL at 12:46

## 2017-04-01 RX ADMIN — KETOROLAC TROMETHAMINE 15 MG: 30 INJECTION, SOLUTION INTRAMUSCULAR at 06:19

## 2017-04-01 RX ADMIN — KETOROLAC TROMETHAMINE 15 MG: 30 INJECTION, SOLUTION INTRAMUSCULAR at 00:52

## 2017-04-01 RX ADMIN — SENNOSIDES AND DOCUSATE SODIUM 2 TABLET: 8.6; 5 TABLET ORAL at 20:09

## 2017-04-01 RX ADMIN — OXYCODONE HYDROCHLORIDE 10 MG: 5 TABLET ORAL at 18:48

## 2017-04-01 RX ADMIN — OXYCODONE HYDROCHLORIDE 10 MG: 5 TABLET ORAL at 15:41

## 2017-04-01 RX ADMIN — ACETAMINOPHEN 975 MG: 325 TABLET, FILM COATED ORAL at 04:15

## 2017-04-01 RX ADMIN — IBUPROFEN 800 MG: 400 TABLET ORAL at 13:01

## 2017-04-01 RX ADMIN — OXYCODONE HYDROCHLORIDE 5 MG: 5 TABLET ORAL at 09:28

## 2017-04-01 RX ADMIN — ACETAMINOPHEN 975 MG: 325 TABLET, FILM COATED ORAL at 20:09

## 2017-04-01 RX ADMIN — OXYCODONE HYDROCHLORIDE 5 MG: 5 TABLET ORAL at 09:31

## 2017-04-01 NOTE — PLAN OF CARE
Problem: Goal Outcome Summary  Goal: Goal Outcome Summary  Outcome: Improving  Meeting goals for shift, see flow sheet. Caring for self and infant in room, spouse present and supportive. Dressing removed by MD, steri strips intact. Up and voided, fluids encouraged and void every 2-3 hours. Comfortable. Anticipate D/C  PPD 3.

## 2017-04-01 NOTE — PLAN OF CARE
"Problem: Goal Outcome Summary  Goal: Goal Outcome Summary  Outcome: Improving  Data: Vital signs within normal limits. Postpartum checks within normal limits - see flow record. Patient emesis x1, reglan given with relief.  Catheter is patent and emptying adequate amounts.  Incision is covered with foam dressing and clean dry and intact, with no drainage. Patient bonding with and breasting infant.  Patient tearful. IV patent and infusing, site has no redness or swelling.     Action: Patient medicated during the shift for nausea and vomiting. See MAR. Patient reassessed within 1 hour after each medication and patient stated she was comfortable. Patient education done about DVT prevention, pain management, and normal postpartum findings. See flow record. When asked if ok, patient responded that she was \"tired\". Supportive atmosphere provided.     Response: Positive attachment behaviors observed with infant. Father of baby present and supportive.     Plan: Report given to GOLD Castro, who assumed patients cares at 1900. Anticipate continued discharge planning.      "

## 2017-04-01 NOTE — PROGRESS NOTES
[facility] Obstetrics Post-Op / Progress Note    Interval History   Doing well.  Pain is well-controlled, but concerned about pain with oral pills given her nausea/emesis.  Did tolerate a light breakfast this morning.   No fevers.  No history of wound drainage, warmth or significant erythema.  Has had nausea since surgery and emesis overnight.  Ambulatory minimally thus far -- to bathroom for Morley removal.  Not lightheaded or dizzy.  Breastfeeding with nipple shield.  Morley is out but has not voided yet.     Medications     oxytocin in 0.9% NaCl 100 mL/hr (03/31/17 1200)     dextrose 5% lactated ringers 125 mL/hr at 03/31/17 1705     oxytocin in 0.9% NaCl       NO Rho (D) immune globulin (RhoGam) needed - mother Rh POSITIVE         sodium chloride (PF)  3 mL Intracatheter Q8H     senna-docusate  1-2 tablet Oral BID     measles, mumps and rubella vaccine  0.5 mL Subcutaneous Once     Tdap (tetanus-diphtheria-acell pertussis)  0.5 mL Intramuscular Once     acetaminophen  975 mg Oral Q8H       Physical Exam   Temp: 98.8  F (37.1  C) Temp src: Oral BP: 106/72 Pulse: 63 Heart Rate: 84 Resp: 18 SpO2: 96 %      Vitals:    03/29/17 1000 03/31/17 0925   Weight: 80.2 kg (176 lb 12.8 oz) 81.2 kg (179 lb)     Vital Signs with Ranges  Temp:  [96.6  F (35.9  C)-98.8  F (37.1  C)] 98.8  F (37.1  C)  Pulse:  [63] 63  Heart Rate:  [64-92] 84  Resp:  [16-18] 18  BP: (103-135)/(61-83) 106/72  SpO2:  [96 %-100 %] 96 %  I/O last 3 completed shifts:  In: 2472 [P.O.:240; I.V.:2232]  Out: 2350 [Urine:1600; Emesis/NG output:750]    Uterine fundus is firm, non-tender and at the level of the umbilicus  Incision C/D/I with steri strips. Minimal oozing noted at steri strips.   Extremities Non-tender    Data   Recent Labs   Lab Test  03/30/17   0728   ABO  O   RH   Pos   AS  Neg     Recent Labs   Lab Test  04/01/17   0723  03/30/17   0728   HGB  10.3*  12.4     Assessment & Plan   -1 Day Post-Op Procedure(s): Scheduled Repeat   Section     Doing well.  Clean wound without signs of infection.  No excessive bleeding  Nausea/emesis: improving. Tolerated light breakfast. Will keep saline lock IV in place for now.   Ambulation encouraged  Breast feeding strategies discussed  Pain well-controlled: Pain control measures as needed. Encouraged scheduled oxycodone, as this is what she is comfortable with from her prior . Keep IV access for pain control as needed given nausea.   Anticipate discharge in 1-2 days    Sindy Carter MD

## 2017-04-01 NOTE — PLAN OF CARE
Problem: Goal Outcome Summary  Goal: Goal Outcome Summary  Outcome: Improving  Patient meeting expected outcomes. Was tolerating fluids and food well, then had 600ml emesis at 0230. Received PRN Reglan which was effective. Bowel sounds audible. VSS. Breastfeeding going well, patient having doubts about how much baby is getting during feeds. Reassured patient that baby was having adequate voids and stools and had little weight loss and was content after feeds. Feeding well with use of nipple shield. Was up to bathroom, tolerated ambulating well. Pain well controlled with scheduled tylenol and toradol.

## 2017-04-01 NOTE — PLAN OF CARE
Problem: Goal Outcome Summary  Goal: Goal Outcome Summary  Outcome: Improving  Data: Vital signs within normal limits. Postpartum checks within normal limits - see flow record. Patient eating and drinking normally. Incision is approximated and has steri strips with no drainage, no redness and no swelling. Patient performing self cares and is able to care for infant. Patient ambulated in javier during shift.     Action: Patient medicated during the shift for pain. See MAR. Patient reassessed within 1 hour after each medication and pain was improved - patient stated she was comfortable. Patient education done about DVT prevention, pain management, and normal postpartum findings. See flow record.     Response: Positive attachment behaviors observed with infant. Father of baby present and supportive.     Plan: Anticipate continued discharge planning.

## 2017-04-02 VITALS
OXYGEN SATURATION: 98 % | HEIGHT: 65 IN | SYSTOLIC BLOOD PRESSURE: 113 MMHG | WEIGHT: 179 LBS | HEART RATE: 63 BPM | RESPIRATION RATE: 18 BRPM | BODY MASS INDEX: 29.82 KG/M2 | TEMPERATURE: 97.7 F | DIASTOLIC BLOOD PRESSURE: 72 MMHG

## 2017-04-02 PROCEDURE — 25000132 ZZH RX MED GY IP 250 OP 250 PS 637: Performed by: OBSTETRICS & GYNECOLOGY

## 2017-04-02 RX ORDER — IBUPROFEN 400 MG/1
400-800 TABLET, FILM COATED ORAL EVERY 6 HOURS PRN
Qty: 120 TABLET | Refills: 0 | Status: SHIPPED | OUTPATIENT
Start: 2017-04-02

## 2017-04-02 RX ORDER — OXYCODONE AND ACETAMINOPHEN 5; 325 MG/1; MG/1
1-2 TABLET ORAL EVERY 6 HOURS PRN
Qty: 30 TABLET | Refills: 0 | Status: SHIPPED | OUTPATIENT
Start: 2017-04-02

## 2017-04-02 RX ORDER — AMOXICILLIN 250 MG
1-2 CAPSULE ORAL 2 TIMES DAILY
Qty: 100 TABLET | Refills: 0 | Status: SHIPPED | OUTPATIENT
Start: 2017-04-02

## 2017-04-02 RX ORDER — OXYCODONE AND ACETAMINOPHEN 5; 325 MG/1; MG/1
1-2 TABLET ORAL EVERY 6 HOURS PRN
Qty: 18 TABLET | Refills: 0 | Status: SHIPPED | OUTPATIENT
Start: 2017-04-02 | End: 2017-04-02

## 2017-04-02 RX ADMIN — IBUPROFEN 800 MG: 400 TABLET ORAL at 00:21

## 2017-04-02 RX ADMIN — OXYCODONE HYDROCHLORIDE 10 MG: 5 TABLET ORAL at 09:30

## 2017-04-02 RX ADMIN — ACETAMINOPHEN 975 MG: 325 TABLET, FILM COATED ORAL at 12:12

## 2017-04-02 RX ADMIN — SENNOSIDES AND DOCUSATE SODIUM 2 TABLET: 8.6; 5 TABLET ORAL at 09:30

## 2017-04-02 RX ADMIN — OXYCODONE HYDROCHLORIDE 10 MG: 5 TABLET ORAL at 12:21

## 2017-04-02 RX ADMIN — OXYCODONE HYDROCHLORIDE 10 MG: 5 TABLET ORAL at 00:21

## 2017-04-02 RX ADMIN — OXYCODONE HYDROCHLORIDE 10 MG: 5 TABLET ORAL at 03:28

## 2017-04-02 RX ADMIN — IBUPROFEN 800 MG: 400 TABLET ORAL at 12:13

## 2017-04-02 RX ADMIN — OXYCODONE HYDROCHLORIDE 10 MG: 5 TABLET ORAL at 06:19

## 2017-04-02 RX ADMIN — ACETAMINOPHEN 975 MG: 325 TABLET, FILM COATED ORAL at 04:15

## 2017-04-02 RX ADMIN — IBUPROFEN 800 MG: 400 TABLET ORAL at 06:19

## 2017-04-02 NOTE — PLAN OF CARE
Problem: Discharge Planning  Goal: Discharge Planning (Adult, OB, Behavioral, Peds)  Outcome: Adequate for Discharge Date Met:  04/02/17  AVS/DC teaching and medications reviewed with patient by Hetal PUGA. Patient is aware of when to call and follow-up. Patient is aware of resources available.  Aware of Cassel scale and when to retake and call.Teaching is completed. No questions, discharged to home with baby at 1309.

## 2017-04-02 NOTE — PROGRESS NOTES
[facility]  OB Post-partum progress note    Assessment: 31 year old  POD#2 s/p RLTCS    Plan:  - Routine post-op postpartum management: encourage ambulation  - Pain: controlled on oral analgesics  - Heme: Hgb stable Acute blood loss anemia: asymptomatic iron in PNV  - Subclinical hyperthyroidism: no current treatment, asymptomatic, has follow up scheduled with Endocrine  - Disposition: plans discharge home POD#2  - Discussed activity restrictions including: nothing per vagina for 6 weeks (sex, tampons, douching), no lifting more than 20 lbs for 6 weeks, no driving for 3 weeks or while on narcotic pain medications  - Reviewed warning signs to call for including fever greater than 100.4F, severe nausea or vomiting, uncontrolled pain, vaginal bleeding more than 1 pad per hour for two hours, signs of depression     Mal Sanchez MD  (pager [phone])  [facility]  2017    Subjective: Rhiannon Stauffer feels well this morning. Was able to sleep last night. Pain control adequate on oral meds. Tolerating regular diet. Has been out of bed. Lochia minimal. Voiding spontaneously. Positive flatus. Breast feeding. Mood Good.    Objective:   Vitals:    17 0928 17 1614 17 0032 17 0925   BP: 123/80 124/70 119/70 113/72   Pulse:       Resp: 18 16 20 18   Temp: 97.7  F (36.5  C) 98.8  F (37.1  C) 98.7  F (37.1  C) 97.7  F (36.5  C)   TempSrc: Axillary Oral Oral Oral   SpO2: 98%      Weight:       Height:           I/O last 3 completed shifts:  In: -   Out: 1000 [Urine:1000]    General: healthy, alert and no distress  Abd: soft, appropriately tender near incision, fundus firm  Incision: clean, dry, intact  Ext: Non-tender, 1+ pitting edema    Hemoglobin   Date Value Ref Range Status   2017 10.3 (L) 11.7 - 15.7 g/dL Final   2017 12.4 11.7 - 15.7 g/dL Final       Lab Results   Component Value Date     RH  Pos 03/30/2017

## 2017-04-02 NOTE — PLAN OF CARE
Problem: Goal Outcome Summary  Goal: Goal Outcome Summary  Outcome: Improving  VSS. Meeting expected outcomes. Pain managed with oxycodone, tylenol and ibuprofen. Incision healing well, no drainage. Independent with cares for self and infant.

## 2017-04-02 NOTE — DISCHARGE INSTRUCTIONS
Postop  Birth Instructions  Follow up in clinic in 6 weeks for postpartum check.  Lactation: [phone]    Activity       Do not lift more than 10 pounds for 6 weeks after surgery.  Ask family and friends for help when you need it.    No driving until you have stopped taking your pain medications (usually two weeks after surgery).    No heavy exercise or activity for 6 weeks.  Don't do anything that will put a strain on your surgery site.    Don't strain when using the toilet.  Your care team may prescribe a stool softener if you have problems with your bowel movements.     To care for your incision:       Keep the incision clean and dry.    Do not soak your incision in water. No swimming or hot tubs until it has fully healed. You may soak in the bathtub if the water level is below your incision.    Do not use peroxide, gel, cream, lotion, or ointment on your incision.    Adjust your clothes to avoid pressure on your surgery site (check the elastic in your underwear for example).     You may see a small amount of clear or pink drainage and this is normal.  Check with your health care provider:       If the drainage increases or has an odor.    If the incision reddens, you have swelling, or develop a rash.    If you have increased pain and the medicine we prescribed doesn't help.    If you have a fever above 100.4 F (38 C) with or without chills when placing thermometer under your tongue.   The area around your incision (surgery wound), will feel numb.  This is normal. The numbness should go away in less than a year.     Keep your hands clean:  Always wash your hands before touching your incision (surgery wound). This helps reduce your risk of infection. If your hands aren't dirty, you may use an alcohol hand-rub to clean your hands. Keep your nails clean and short.    Call your healthcare provider if you have any of these symptoms:       You soak a sanitary pad with blood within 1 hour, or you see blood  clots larger than a golf ball.    Bleeding that lasts more than 6 weeks.    Vaginal discharge that smells bad.    Severe pain, cramping or tenderness in your lower belly area.    A need to urinate more frequently (use the toilet more often), more urgently (use the toilet very quickly), or it burns when you urinate.    Nausea and vomiting.    Redness, swelling or pain around a vein in your leg.    Problems breastfeeding or a red or painful area on your breast.    Chest pain and cough or are gasping for air.    Problems with coping with sadness, anxiety or depression. If you have concerns about hurting yourself or the baby, call your provider immediately.      You have questions or concerns after you return home.

## 2017-04-02 NOTE — DISCHARGE SUMMARY
[facility]    Discharge Summary  Obstetrics    Date of Admission:  3/31/2017  Date of Discharge:  2017  Discharging Provider: Mal Sanchez    Discharge Diagnoses   Patient Active Problem List   Diagnosis     Indication for care in labor and delivery, delivered   S/p  section  Prior  section  Subclinical hyperthyroidism  Acute blood loss anemia: asymptomatic     Procedure/Surgery Information   Procedure: Procedure(s):  Repeat  Section  - Wound Class: I-Clean   Surgeon(s): Surgeon(s) and Role:     * Sindy Carter MD - Primary     History of Present Illness   Rhiannon Stauffer is a 31 year old now  female who presented to L&D for scheduled repeat . Her pregnancy has been complicated by above diagnoses. Please see her admit H&P for full details of her PMH, PSH, Meds, Allergies and exam on admit.    Hospital course:  The patient delivered via  section @ 39w1d. Please see her operative note for full details regarding the surgery.     Her postoperative course was uncomplicated. On post-operative day 2, she was meeting all of her postoperative goals and deemed stable for discharge. She was voiding without difficulty, tolerating a regular diet without nausea and vomiting, her pain was well controlled on oral pain medicines and her lochia was appropriate.    Feeding: breast feeding    Hgb:   Lab Results   Component Value Date    HGB 10.3 2017    HGB 12.4 2017       Lab Results   Component Value Date    RH  Pos 2017    and Rhogam was not given    Contraception was discussed and will be addressed further at postpartum appointment.    Instructions:  Rhiannon Stauffer was discharged to home in stable condition with the following instructions/medications:  1) Call for temperature greater than 100.4F, foul smelling vaginal discharge, bleeding more than 1 pad per hour for 2 hrs, pain not controlled by oral pain meds, severe  constipation or severe nausea or vomiting, incision bleeding or discharge, or separation of wound, or symptoms of depression  2) She was instructed to follow-up with her primary OB in 6 weeks for a routine postpartum visit, and with Endocrine within 2 weeks  3) She was instructed to continue her PNV on discharge if she wished to breast feed her infant.    Mal Sanchez MD    Discharge Disposition   Discharged to home   Condition at discharge: Stable    Pending Results   Final pathology results: No pathology submitted  These results will be followed up by   Unresulted Labs Ordered in the Past 30 Days of this Admission     No orders found from 2017 to 2017.          Primary Care Physician   Sindy Carter    Consultations This Hospital Stay   HOME CARE POST PARTUM/ IP CONSULT  LACTATION IP CONSULT    Discharge Orders     Activity   Review discharge instructions     Discharge Instructions - Postpartum visit   Schedule postpartum visit with your provider and return to clinic in 6 weeks.     Diet   Resume previous diet       Discharge Medications   Current Discharge Medication List      START taking these medications    Details   ibuprofen (ADVIL/MOTRIN) 400 MG tablet Take 1-2 tablets (400-800 mg) by mouth every 6 hours as needed for other (cramping)  Qty: 120 tablet, Refills: 0    Associated Diagnoses: S/P  section      senna-docusate (SENOKOT-S;PERICOLACE) 8.6-50 MG per tablet Take 1-2 tablets by mouth 2 times daily  Qty: 100 tablet, Refills: 0    Associated Diagnoses: S/P  section      oxyCODONE-acetaminophen (PERCOCET) 5-325 MG per tablet Take 1-2 tablets by mouth every 6 hours as needed for pain maximum 8 tablet(s) per day  Qty: 30 tablet, Refills: 0    Associated Diagnoses: S/P  section         CONTINUE these medications which have NOT CHANGED    Details   Prenatal Multivit-Min-Fe-FA (PRENATAL VITAMINS PO) Take 1 tablet by mouth daily           Allergies    Allergies   Allergen Reactions     Sulfa Drugs      Was a baby

## 2017-04-02 NOTE — PLAN OF CARE
Problem: Goal Outcome Summary  Goal: Goal Outcome Summary  Outcome: Adequate for Discharge Date Met:  04/02/17  Meeting goals for shift and early discharge to home. Caring for self and infant in room, bonding well. Comfortable, no BM yet, stool softener given and fluids encouraged. Suppository offered. Spouse present and supportive.

## 2017-04-02 NOTE — LACTATION NOTE
Lactation in to see patient. Wanting help with nursing but attempted and could not get baby on, and family not going to sleep. Encouraged to call with next feed prn.

## (undated) DEVICE — LINEN DRAPE 54X72" 5467

## (undated) DEVICE — SUCTION KIWI VAC  VAC-6000M

## (undated) DEVICE — PAD CHUX UNDERPAD 30X36" P3036C

## (undated) DEVICE — GLOVE PROTEXIS POWDER FREE SMT 6.0  2D72PT60X

## (undated) DEVICE — GLOVE PROTEXIS BLUE W/NEU-THERA 6.5  2D73EB65

## (undated) DEVICE — LINEN TOWEL PACK X10 5473

## (undated) DEVICE — DRSG ABDOMINAL 07 1/2X8" 7197D

## (undated) DEVICE — SUCTION CANISTER STRAW 65652-008

## (undated) DEVICE — DRSG STERI STRIP 1X5" R1548

## (undated) DEVICE — SUCTION CANISTER MEDIVAC LINER 3000ML W/LID 65651-530

## (undated) DEVICE — LINEN BABY BLANKET 5434

## (undated) DEVICE — CAP BABY PINK/BLUE IC-2

## (undated) DEVICE — STOCKING SLEEVE VASOPRESS COMPRESSION CALF MED 18" VP501M

## (undated) DEVICE — PREP CHLORAPREP 26ML TINTED ORANGE  260815

## (undated) DEVICE — BLADE CLIPPER SGL USE 9680

## (undated) DEVICE — ESU GROUND PAD ADULT W/CORD E7507

## (undated) DEVICE — SOL NACL 0.9% IRRIG 1000ML BOTTLE 2F7124

## (undated) DEVICE — GLOVE PROTEXIS MICRO 6.5  2D73PM65

## (undated) DEVICE — LINEN FULL SHEET 5511

## (undated) DEVICE — HEMOSTAT ABSORBABLE ARISTA 5GM SM0007-USA

## (undated) DEVICE — SU VICRYL 3-0 CT-1 36" J344H

## (undated) DEVICE — SU VICRYL 3-0 SH 27" J316H

## (undated) DEVICE — TRANSFER DEVICE BLOOD NDL HOLDER 364880

## (undated) DEVICE — PACK C-SECTION LF PL15OTA83B

## (undated) DEVICE — BAG CLEAR TRASH 1.3M 39X33" P4040C

## (undated) DEVICE — CATH TRAY FOLEY SURESTEP 16FR DRAIN BAG STATOCK A899916

## (undated) DEVICE — DRSG STERI STRIP 1/2X4" R1547

## (undated) DEVICE — SU VICRYL 0 CT-1 36" J346H

## (undated) DEVICE — SU VICRYL 2-0 CT-1 36" J345H

## (undated) DEVICE — SOL WATER IRRIG 1000ML BOTTLE 2F7114

## (undated) DEVICE — LINEN HALF SHEET 5512

## (undated) DEVICE — STPL SKIN 35W APPOSE 8886803712

## (undated) RX ORDER — ONDANSETRON 2 MG/ML
INJECTION INTRAMUSCULAR; INTRAVENOUS
Status: DISPENSED
Start: 2017-03-31

## (undated) RX ORDER — PHENYLEPHRINE HCL IN 0.9% NACL 1 MG/10 ML
SYRINGE (ML) INTRAVENOUS
Status: DISPENSED
Start: 2017-03-31

## (undated) RX ORDER — MORPHINE SULFATE 1 MG/ML
INJECTION, SOLUTION EPIDURAL; INTRATHECAL; INTRAVENOUS
Status: DISPENSED
Start: 2017-03-31

## (undated) RX ORDER — OXYTOCIN/0.9 % SODIUM CHLORIDE 30/500 ML
PLASTIC BAG, INJECTION (ML) INTRAVENOUS
Status: DISPENSED
Start: 2017-03-31

## (undated) RX ORDER — EPHEDRINE SULFATE 50 MG/ML
INJECTION, SOLUTION INTRAMUSCULAR; INTRAVENOUS; SUBCUTANEOUS
Status: DISPENSED
Start: 2017-03-31